# Patient Record
Sex: FEMALE
[De-identification: names, ages, dates, MRNs, and addresses within clinical notes are randomized per-mention and may not be internally consistent; named-entity substitution may affect disease eponyms.]

---

## 2022-09-02 ENCOUNTER — DASHBOARD ENCOUNTERS (OUTPATIENT)
Age: 72
End: 2022-09-02

## 2022-09-02 ENCOUNTER — OFFICE VISIT (OUTPATIENT)
Dept: URBAN - METROPOLITAN AREA CLINIC 7 | Facility: CLINIC | Age: 72
End: 2022-09-02
Payer: MEDICARE

## 2022-09-02 VITALS
DIASTOLIC BLOOD PRESSURE: 76 MMHG | SYSTOLIC BLOOD PRESSURE: 110 MMHG | BODY MASS INDEX: 23.9 KG/M2 | WEIGHT: 140 LBS | HEIGHT: 64 IN | TEMPERATURE: 98 F

## 2022-09-02 DIAGNOSIS — K57.32 DIVERTICULITIS OF LARGE INTESTINE WITH COMPLICATION: ICD-10-CM

## 2022-09-02 DIAGNOSIS — R10.32 LLQ PAIN: ICD-10-CM

## 2022-09-02 PROCEDURE — 99204 OFFICE O/P NEW MOD 45 MIN: CPT | Performed by: INTERNAL MEDICINE

## 2022-09-02 RX ORDER — LISINOPRIL 40 MG/1
1 TABLET TABLET ORAL ONCE A DAY
Qty: 30 | Status: ACTIVE | COMMUNITY
Start: 2022-09-02

## 2022-09-02 RX ORDER — ASCORBIC ACID 1000 MG
AS DIRECTED TABLET ORAL
Status: ACTIVE | COMMUNITY
Start: 2022-09-02

## 2022-09-02 RX ORDER — ESCITALOPRAM OXALATE 20 MG/1
1 TABLET TABLET, FILM COATED ORAL ONCE A DAY
Qty: 30 | Status: ACTIVE | COMMUNITY
Start: 2022-09-02
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
## 2022-09-12 PROBLEM — 733159000: Status: ACTIVE | Noted: 2022-09-02

## 2022-09-12 PROBLEM — 301716002: Status: ACTIVE | Noted: 2022-09-02

## 2022-09-26 ENCOUNTER — LAB OUTSIDE AN ENCOUNTER (OUTPATIENT)
Dept: URBAN - METROPOLITAN AREA CLINIC 7 | Facility: CLINIC | Age: 72
End: 2022-09-26

## 2022-09-26 ENCOUNTER — OFFICE VISIT (OUTPATIENT)
Dept: URBAN - METROPOLITAN AREA SURGERY CENTER 5 | Facility: SURGERY CENTER | Age: 72
End: 2022-09-26
Payer: MEDICARE

## 2022-09-26 DIAGNOSIS — Z53.8 FAILED ATTEMPTED SURGICAL PROCEDURE: ICD-10-CM

## 2022-09-26 DIAGNOSIS — Z87.19 ESOPHAGEAL FOOD BOLUS: ICD-10-CM

## 2022-09-26 DIAGNOSIS — K57.30 ACQUIRED DIVERTICULOSIS OF COLON: ICD-10-CM

## 2022-09-26 DIAGNOSIS — K64.0 BLEEDING GRADE I HEMORRHOIDS: ICD-10-CM

## 2022-09-26 PROCEDURE — 45378 DIAGNOSTIC COLONOSCOPY: CPT | Performed by: INTERNAL MEDICINE

## 2022-09-26 RX ORDER — LISINOPRIL 40 MG/1
1 TABLET TABLET ORAL ONCE A DAY
Qty: 30 | Status: ACTIVE | COMMUNITY
Start: 2022-09-02

## 2022-09-26 RX ORDER — ESCITALOPRAM OXALATE 20 MG/1
1 TABLET TABLET, FILM COATED ORAL ONCE A DAY
Qty: 30 | Status: ACTIVE | COMMUNITY
Start: 2022-09-02

## 2022-09-26 RX ORDER — ASCORBIC ACID 1000 MG
AS DIRECTED TABLET ORAL
Status: ACTIVE | COMMUNITY
Start: 2022-09-02

## 2022-10-07 PROBLEM — Z00.00 ENCOUNTER FOR PREVENTIVE HEALTH EXAMINATION: Status: ACTIVE | Noted: 2022-10-07

## 2022-10-11 ENCOUNTER — NON-APPOINTMENT (OUTPATIENT)
Age: 72
End: 2022-10-11

## 2022-10-12 ENCOUNTER — TELEPHONE ENCOUNTER (OUTPATIENT)
Dept: URBAN - METROPOLITAN AREA CLINIC 7 | Facility: CLINIC | Age: 72
End: 2022-10-12

## 2022-10-12 ENCOUNTER — LAB OUTSIDE AN ENCOUNTER (OUTPATIENT)
Dept: URBAN - METROPOLITAN AREA CLINIC 7 | Facility: CLINIC | Age: 72
End: 2022-10-12

## 2022-10-19 ENCOUNTER — NON-APPOINTMENT (OUTPATIENT)
Age: 72
End: 2022-10-19

## 2022-10-19 ENCOUNTER — APPOINTMENT (OUTPATIENT)
Dept: SURGERY | Facility: CLINIC | Age: 72
End: 2022-10-19

## 2022-10-19 VITALS
HEIGHT: 64 IN | BODY MASS INDEX: 23.9 KG/M2 | TEMPERATURE: 97.3 F | WEIGHT: 140 LBS | OXYGEN SATURATION: 97 % | HEART RATE: 77 BPM | DIASTOLIC BLOOD PRESSURE: 100 MMHG | SYSTOLIC BLOOD PRESSURE: 170 MMHG

## 2022-10-19 DIAGNOSIS — F41.9 ANXIETY DISORDER, UNSPECIFIED: ICD-10-CM

## 2022-10-19 DIAGNOSIS — I10 ESSENTIAL (PRIMARY) HYPERTENSION: ICD-10-CM

## 2022-10-19 LAB
ALBUMIN SERPL ELPH-MCNC: 4.9 G/DL
ALP BLD-CCNC: 60 U/L
ALT SERPL-CCNC: 11 U/L
ANION GAP SERPL CALC-SCNC: 12 MMOL/L
AST SERPL-CCNC: 18 U/L
BASOPHILS # BLD AUTO: 0.06 K/UL
BASOPHILS NFR BLD AUTO: 1.1 %
BILIRUB SERPL-MCNC: 0.2 MG/DL
BUN SERPL-MCNC: 13 MG/DL
CALCIUM SERPL-MCNC: 9.5 MG/DL
CHLORIDE SERPL-SCNC: 103 MMOL/L
CO2 SERPL-SCNC: 27 MMOL/L
CREAT SERPL-MCNC: 0.8 MG/DL
EGFR: 79 ML/MIN/1.73M2
EOSINOPHIL # BLD AUTO: 0.04 K/UL
EOSINOPHIL NFR BLD AUTO: 0.8 %
GLUCOSE SERPL-MCNC: 90 MG/DL
HCT VFR BLD CALC: 36.4 %
HGB BLD-MCNC: 12 G/DL
IMM GRANULOCYTES NFR BLD AUTO: 0.2 %
LYMPHOCYTES # BLD AUTO: 1.93 K/UL
LYMPHOCYTES NFR BLD AUTO: 36.2 %
MAN DIFF?: NORMAL
MCHC RBC-ENTMCNC: 28.8 PG
MCHC RBC-ENTMCNC: 33 G/DL
MCV RBC AUTO: 87.5 FL
MONOCYTES # BLD AUTO: 0.32 K/UL
MONOCYTES NFR BLD AUTO: 6 %
NEUTROPHILS # BLD AUTO: 2.97 K/UL
NEUTROPHILS NFR BLD AUTO: 55.7 %
PLATELET # BLD AUTO: 216 K/UL
POTASSIUM SERPL-SCNC: 4.1 MMOL/L
PROT SERPL-MCNC: 7.2 G/DL
RBC # BLD: 4.16 M/UL
RBC # FLD: 13.2 %
SODIUM SERPL-SCNC: 142 MMOL/L
WBC # FLD AUTO: 5.33 K/UL

## 2022-10-19 PROCEDURE — 99204 OFFICE O/P NEW MOD 45 MIN: CPT

## 2022-10-19 RX ORDER — ESCITALOPRAM OXALATE 5 MG/1
TABLET, FILM COATED ORAL
Refills: 0 | Status: ACTIVE | COMMUNITY

## 2022-10-19 RX ORDER — LISINOPRIL 30 MG/1
TABLET ORAL
Refills: 0 | Status: ACTIVE | COMMUNITY

## 2022-10-19 NOTE — PHYSICAL EXAM
[Abdomen Masses] : No abdominal masses [Abdomen Tenderness] : ~T No ~M abdominal tenderness [Respiratory Effort] : normal respiratory effort [Normal Rate and Rhythm] : normal rate and rhythm [de-identified] : awake, alert and in no acute distress
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
## 2022-10-19 NOTE — ASSESSMENT
[FreeTextEntry1] : 71F with complicated sigmoid diverticulitis\par \par We will obtain a CTAP prior to surgery to evaluate her anatomy.  I recommended that she undergo robot assisted laparoscopic sigmoidectomy possible open, possible ostomy.  All risks benefits and alternatives were discussed including bleeding, infection, damage to surrounding structures including the ureters, anastomotic leak, anastomotic stricture, cardiopulmonary events, thromboembolic events and hernia. We expect a 3-7 day hospital stay and 6-8 week recovery. Patient expressed understanding and was in agreement with the plan.\par

## 2022-10-20 ENCOUNTER — TRANSCRIPTION ENCOUNTER (OUTPATIENT)
Age: 72
End: 2022-10-20

## 2022-10-27 ENCOUNTER — RESULT REVIEW (OUTPATIENT)
Age: 72
End: 2022-10-27

## 2022-10-27 ENCOUNTER — OUTPATIENT (OUTPATIENT)
Dept: OUTPATIENT SERVICES | Facility: HOSPITAL | Age: 72
LOS: 1 days | Discharge: HOME | End: 2022-10-27

## 2022-10-27 VITALS
TEMPERATURE: 98 F | WEIGHT: 139.99 LBS | RESPIRATION RATE: 17 BRPM | OXYGEN SATURATION: 98 % | HEIGHT: 64 IN | SYSTOLIC BLOOD PRESSURE: 184 MMHG | HEART RATE: 54 BPM | DIASTOLIC BLOOD PRESSURE: 79 MMHG

## 2022-10-27 DIAGNOSIS — Z96.642 PRESENCE OF LEFT ARTIFICIAL HIP JOINT: Chronic | ICD-10-CM

## 2022-10-27 DIAGNOSIS — K57.32 DIVERTICULITIS OF LARGE INTESTINE WITHOUT PERFORATION OR ABSCESS WITHOUT BLEEDING: ICD-10-CM

## 2022-10-27 DIAGNOSIS — K56.699 OTHER INTESTINAL OBSTRUCTION UNSPECIFIED AS TO PARTIAL VERSUS COMPLETE OBSTRUCTION: ICD-10-CM

## 2022-10-27 DIAGNOSIS — Z98.890 OTHER SPECIFIED POSTPROCEDURAL STATES: Chronic | ICD-10-CM

## 2022-10-27 DIAGNOSIS — Z90.89 ACQUIRED ABSENCE OF OTHER ORGANS: Chronic | ICD-10-CM

## 2022-10-27 DIAGNOSIS — Z90.710 ACQUIRED ABSENCE OF BOTH CERVIX AND UTERUS: Chronic | ICD-10-CM

## 2022-10-27 DIAGNOSIS — Z01.818 ENCOUNTER FOR OTHER PREPROCEDURAL EXAMINATION: ICD-10-CM

## 2022-10-27 DIAGNOSIS — Z98.61 CORONARY ANGIOPLASTY STATUS: Chronic | ICD-10-CM

## 2022-10-27 LAB
A1C WITH ESTIMATED AVERAGE GLUCOSE RESULT: 5.6 % — SIGNIFICANT CHANGE UP (ref 4–5.6)
ALBUMIN SERPL ELPH-MCNC: 4.7 G/DL — SIGNIFICANT CHANGE UP (ref 3.5–5.2)
ALP SERPL-CCNC: 66 U/L — SIGNIFICANT CHANGE UP (ref 30–115)
ALT FLD-CCNC: 9 U/L — SIGNIFICANT CHANGE UP (ref 0–41)
ANION GAP SERPL CALC-SCNC: 11 MMOL/L — SIGNIFICANT CHANGE UP (ref 7–14)
APPEARANCE UR: CLEAR — SIGNIFICANT CHANGE UP
APTT BLD: 30.5 SEC — SIGNIFICANT CHANGE UP (ref 27–39.2)
AST SERPL-CCNC: 15 U/L — SIGNIFICANT CHANGE UP (ref 0–41)
BACTERIA # UR AUTO: NEGATIVE — SIGNIFICANT CHANGE UP
BASOPHILS # BLD AUTO: 0.07 K/UL — SIGNIFICANT CHANGE UP (ref 0–0.2)
BASOPHILS NFR BLD AUTO: 1.5 % — HIGH (ref 0–1)
BILIRUB SERPL-MCNC: 0.2 MG/DL — SIGNIFICANT CHANGE UP (ref 0.2–1.2)
BILIRUB UR-MCNC: NEGATIVE — SIGNIFICANT CHANGE UP
BLD GP AB SCN SERPL QL: SIGNIFICANT CHANGE UP
BUN SERPL-MCNC: 19 MG/DL — SIGNIFICANT CHANGE UP (ref 10–20)
CALCIUM SERPL-MCNC: 9 MG/DL — SIGNIFICANT CHANGE UP (ref 8.4–10.5)
CHLORIDE SERPL-SCNC: 105 MMOL/L — SIGNIFICANT CHANGE UP (ref 98–110)
CO2 SERPL-SCNC: 27 MMOL/L — SIGNIFICANT CHANGE UP (ref 17–32)
COLOR SPEC: YELLOW — SIGNIFICANT CHANGE UP
CREAT SERPL-MCNC: 0.8 MG/DL — SIGNIFICANT CHANGE UP (ref 0.7–1.5)
DIFF PNL FLD: NEGATIVE — SIGNIFICANT CHANGE UP
EGFR: 79 ML/MIN/1.73M2 — SIGNIFICANT CHANGE UP
EOSINOPHIL # BLD AUTO: 0.11 K/UL — SIGNIFICANT CHANGE UP (ref 0–0.7)
EOSINOPHIL NFR BLD AUTO: 2.3 % — SIGNIFICANT CHANGE UP (ref 0–8)
EPI CELLS # UR: 4 /HPF — SIGNIFICANT CHANGE UP (ref 0–5)
ESTIMATED AVERAGE GLUCOSE: 114 MG/DL — SIGNIFICANT CHANGE UP (ref 68–114)
GLUCOSE SERPL-MCNC: 90 MG/DL — SIGNIFICANT CHANGE UP (ref 70–99)
GLUCOSE UR QL: NEGATIVE — SIGNIFICANT CHANGE UP
HCT VFR BLD CALC: 39 % — SIGNIFICANT CHANGE UP (ref 37–47)
HGB BLD-MCNC: 13 G/DL — SIGNIFICANT CHANGE UP (ref 12–16)
HYALINE CASTS # UR AUTO: 3 /LPF — SIGNIFICANT CHANGE UP (ref 0–7)
IMM GRANULOCYTES NFR BLD AUTO: 0.4 % — HIGH (ref 0.1–0.3)
INR BLD: 0.89 RATIO — SIGNIFICANT CHANGE UP (ref 0.65–1.3)
KETONES UR-MCNC: NEGATIVE — SIGNIFICANT CHANGE UP
LEUKOCYTE ESTERASE UR-ACNC: NEGATIVE — SIGNIFICANT CHANGE UP
LYMPHOCYTES # BLD AUTO: 1.59 K/UL — SIGNIFICANT CHANGE UP (ref 1.2–3.4)
LYMPHOCYTES # BLD AUTO: 33.1 % — SIGNIFICANT CHANGE UP (ref 20.5–51.1)
MCHC RBC-ENTMCNC: 29.4 PG — SIGNIFICANT CHANGE UP (ref 27–31)
MCHC RBC-ENTMCNC: 33.3 G/DL — SIGNIFICANT CHANGE UP (ref 32–37)
MCV RBC AUTO: 88.2 FL — SIGNIFICANT CHANGE UP (ref 81–99)
MONOCYTES # BLD AUTO: 0.32 K/UL — SIGNIFICANT CHANGE UP (ref 0.1–0.6)
MONOCYTES NFR BLD AUTO: 6.7 % — SIGNIFICANT CHANGE UP (ref 1.7–9.3)
MRSA PCR RESULT.: NEGATIVE — SIGNIFICANT CHANGE UP
NEUTROPHILS # BLD AUTO: 2.69 K/UL — SIGNIFICANT CHANGE UP (ref 1.4–6.5)
NEUTROPHILS NFR BLD AUTO: 56 % — SIGNIFICANT CHANGE UP (ref 42.2–75.2)
NITRITE UR-MCNC: NEGATIVE — SIGNIFICANT CHANGE UP
NRBC # BLD: 0 /100 WBCS — SIGNIFICANT CHANGE UP (ref 0–0)
PH UR: 6 — SIGNIFICANT CHANGE UP (ref 5–8)
PLATELET # BLD AUTO: 212 K/UL — SIGNIFICANT CHANGE UP (ref 130–400)
POTASSIUM SERPL-MCNC: 4.5 MMOL/L — SIGNIFICANT CHANGE UP (ref 3.5–5)
POTASSIUM SERPL-SCNC: 4.5 MMOL/L — SIGNIFICANT CHANGE UP (ref 3.5–5)
PROT SERPL-MCNC: 7.1 G/DL — SIGNIFICANT CHANGE UP (ref 6–8)
PROT UR-MCNC: ABNORMAL
PROTHROM AB SERPL-ACNC: 10.1 SEC — SIGNIFICANT CHANGE UP (ref 9.95–12.87)
RBC # BLD: 4.42 M/UL — SIGNIFICANT CHANGE UP (ref 4.2–5.4)
RBC # FLD: 13.2 % — SIGNIFICANT CHANGE UP (ref 11.5–14.5)
RBC CASTS # UR COMP ASSIST: 3 /HPF — SIGNIFICANT CHANGE UP (ref 0–4)
SODIUM SERPL-SCNC: 143 MMOL/L — SIGNIFICANT CHANGE UP (ref 135–146)
SP GR SPEC: 1.03 — HIGH (ref 1.01–1.03)
UROBILINOGEN FLD QL: SIGNIFICANT CHANGE UP
WBC # BLD: 4.8 K/UL — SIGNIFICANT CHANGE UP (ref 4.8–10.8)
WBC # FLD AUTO: 4.8 K/UL — SIGNIFICANT CHANGE UP (ref 4.8–10.8)
WBC UR QL: 1 /HPF — SIGNIFICANT CHANGE UP (ref 0–5)

## 2022-10-27 PROCEDURE — 93010 ELECTROCARDIOGRAM REPORT: CPT

## 2022-10-27 PROCEDURE — 71046 X-RAY EXAM CHEST 2 VIEWS: CPT | Mod: 26

## 2022-10-27 NOTE — H&P PST ADULT - HISTORY OF PRESENT ILLNESS
PATIENT DENIES CHEST PAIN, SHORTNESS OF BREATH, PALPITATIONS, COUGHING, FEVER, DYSURIA.  CAN WALK UP 7-8 FLIGHTS OF STEPS WITHOUT SOB.    NO COUGH, FEVER, SORE THROAT, HEADACHE, LOSS OF TASTE OR SMELL. NO KNOWN EXPOSURE TO ANYONE WITH COVID. PATIENT WAS INSTRUCTED TO ISOLATE FROM NOW UNTIL THE SURGERY.    Anesthesia Alert  NO--Difficult Airway  NO--History of neck surgery or radiation  NO--Limited ROM of neck  NO--History of Malignant hyperthermia  NO--Personal or family history of Pseudocholinesterase deficiency  NO--Prior Anesthesia Complication  NO--Latex Allergy  NO--Loose teeth  NO--History of Rheumatoid Arthritis  NO--ANDIE  NO--bleeding risk

## 2022-10-27 NOTE — H&P PST ADULT - NSICDXPASTSURGICALHX_GEN_ALL_CORE_FT
PAST SURGICAL HISTORY:  H/O coronary angioplasty STENT X1 (2018)    S/P tonsillectomy     S/P total hysterectomy     Status post hip replacement, left     Status post ORIF of fracture of ankle
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
## 2022-10-27 NOTE — H&P PST ADULT - NSICDXPASTMEDICALHX_GEN_ALL_CORE_FT
PAST MEDICAL HISTORY:  CAD (coronary artery disease) CARDIAC STENT X1 2018    Depression     Diverticulitis     HTN (hypertension)

## 2022-10-27 NOTE — H&P PST ADULT - NSICDXFAMILYHX_GEN_ALL_CORE_FT
FAMILY HISTORY:  Father  Still living? Unknown  Family history of heart attack, Age at diagnosis: Age Unknown    Mother  Still living? No  Family history of diabetes mellitus (DM), Age at diagnosis: Age Unknown  FH: esophageal cancer, Age at diagnosis: Age Unknown

## 2022-11-02 ENCOUNTER — OUTPATIENT (OUTPATIENT)
Dept: OUTPATIENT SERVICES | Facility: HOSPITAL | Age: 72
LOS: 1 days | Discharge: HOME | End: 2022-11-02

## 2022-11-02 ENCOUNTER — RESULT REVIEW (OUTPATIENT)
Age: 72
End: 2022-11-02

## 2022-11-02 DIAGNOSIS — Z90.89 ACQUIRED ABSENCE OF OTHER ORGANS: Chronic | ICD-10-CM

## 2022-11-02 DIAGNOSIS — Z90.710 ACQUIRED ABSENCE OF BOTH CERVIX AND UTERUS: Chronic | ICD-10-CM

## 2022-11-02 DIAGNOSIS — K57.32 DIVERTICULITIS OF LARGE INTESTINE WITHOUT PERFORATION OR ABSCESS WITHOUT BLEEDING: ICD-10-CM

## 2022-11-02 DIAGNOSIS — Z98.61 CORONARY ANGIOPLASTY STATUS: Chronic | ICD-10-CM

## 2022-11-02 DIAGNOSIS — Z98.890 OTHER SPECIFIED POSTPROCEDURAL STATES: Chronic | ICD-10-CM

## 2022-11-02 DIAGNOSIS — Z96.642 PRESENCE OF LEFT ARTIFICIAL HIP JOINT: Chronic | ICD-10-CM

## 2022-11-02 PROCEDURE — 74177 CT ABD & PELVIS W/CONTRAST: CPT | Mod: 26

## 2022-11-03 ENCOUNTER — NON-APPOINTMENT (OUTPATIENT)
Age: 72
End: 2022-11-03

## 2022-11-04 ENCOUNTER — NON-APPOINTMENT (OUTPATIENT)
Age: 72
End: 2022-11-04

## 2022-11-04 PROBLEM — K57.92 DIVERTICULITIS OF INTESTINE, PART UNSPECIFIED, WITHOUT PERFORATION OR ABSCESS WITHOUT BLEEDING: Chronic | Status: ACTIVE | Noted: 2022-10-27

## 2022-11-04 PROBLEM — I25.10 ATHEROSCLEROTIC HEART DISEASE OF NATIVE CORONARY ARTERY WITHOUT ANGINA PECTORIS: Chronic | Status: ACTIVE | Noted: 2022-10-27

## 2022-11-04 PROBLEM — I10 ESSENTIAL (PRIMARY) HYPERTENSION: Chronic | Status: ACTIVE | Noted: 2022-10-27

## 2022-11-04 PROBLEM — F32.A DEPRESSION, UNSPECIFIED: Chronic | Status: ACTIVE | Noted: 2022-10-27

## 2022-11-07 ENCOUNTER — LABORATORY RESULT (OUTPATIENT)
Age: 72
End: 2022-11-07

## 2022-11-09 NOTE — ASU PATIENT PROFILE, ADULT - WILL THE PATIENT ACCEPT THE PFIZER COVID-19 VACCINE IF ELIGIBLE AND IT IS AVAILABLE?
[facility], [place], Emergency Department  [address] [place] [id number]  Phone:  [phone]                                    Samuel Brooks   MRN: [id number]    Department:  [facility], Emergency Department   Date of Visit:  12/26/2019           After Visit Summary Signature Page    I have received my discharge instructions, and my questions have been answered. I have discussed any challenges I see with this plan with the nurse or doctor.    ..........................................................................................................................................  Patient/Patient Representative Signature      ..........................................................................................................................................  Patient Representative Print Name and Relationship to Patient    ..................................................               ................................................  Date                                   Time    ..........................................................................................................................................  Reviewed by Signature/Title    ...................................................              ..............................................  Date                                               Time          22EPIC Rev 08/18       
No

## 2022-11-09 NOTE — ASU PATIENT PROFILE, ADULT - NSICDXPASTSURGICALHX_GEN_ALL_CORE_FT
PAST SURGICAL HISTORY:  H/O coronary angioplasty STENT X1 (2018)    H/O right wrist surgery     S/P tonsillectomy     S/P total hysterectomy     Status post hip replacement, left     Status post ORIF of fracture of ankle

## 2022-11-10 ENCOUNTER — RESULT REVIEW (OUTPATIENT)
Age: 72
End: 2022-11-10

## 2022-11-10 ENCOUNTER — INPATIENT (INPATIENT)
Facility: HOSPITAL | Age: 72
LOS: 1 days | Discharge: HOME | End: 2022-11-12
Attending: COLON & RECTAL SURGERY | Admitting: COLON & RECTAL SURGERY

## 2022-11-10 ENCOUNTER — APPOINTMENT (OUTPATIENT)
Dept: SURGERY | Facility: HOSPITAL | Age: 72
End: 2022-11-10

## 2022-11-10 ENCOUNTER — TRANSCRIPTION ENCOUNTER (OUTPATIENT)
Age: 72
End: 2022-11-10

## 2022-11-10 VITALS
SYSTOLIC BLOOD PRESSURE: 137 MMHG | TEMPERATURE: 98 F | RESPIRATION RATE: 16 BRPM | HEIGHT: 64 IN | OXYGEN SATURATION: 99 % | DIASTOLIC BLOOD PRESSURE: 65 MMHG | HEART RATE: 62 BPM | WEIGHT: 139.99 LBS

## 2022-11-10 DIAGNOSIS — Z96.642 PRESENCE OF LEFT ARTIFICIAL HIP JOINT: Chronic | ICD-10-CM

## 2022-11-10 DIAGNOSIS — Z98.890 OTHER SPECIFIED POSTPROCEDURAL STATES: Chronic | ICD-10-CM

## 2022-11-10 DIAGNOSIS — Z98.61 CORONARY ANGIOPLASTY STATUS: Chronic | ICD-10-CM

## 2022-11-10 DIAGNOSIS — Z90.710 ACQUIRED ABSENCE OF BOTH CERVIX AND UTERUS: Chronic | ICD-10-CM

## 2022-11-10 DIAGNOSIS — Z90.89 ACQUIRED ABSENCE OF OTHER ORGANS: Chronic | ICD-10-CM

## 2022-11-10 LAB
ABO RH CONFIRMATION: SIGNIFICANT CHANGE UP
ANION GAP SERPL CALC-SCNC: 11 MMOL/L — SIGNIFICANT CHANGE UP (ref 7–14)
BASOPHILS # BLD AUTO: 0.02 K/UL — SIGNIFICANT CHANGE UP (ref 0–0.2)
BASOPHILS NFR BLD AUTO: 0.2 % — SIGNIFICANT CHANGE UP (ref 0–1)
BUN SERPL-MCNC: 11 MG/DL — SIGNIFICANT CHANGE UP (ref 10–20)
CALCIUM SERPL-MCNC: 8.2 MG/DL — LOW (ref 8.4–10.5)
CHLORIDE SERPL-SCNC: 105 MMOL/L — SIGNIFICANT CHANGE UP (ref 98–110)
CO2 SERPL-SCNC: 23 MMOL/L — SIGNIFICANT CHANGE UP (ref 17–32)
CREAT SERPL-MCNC: 0.8 MG/DL — SIGNIFICANT CHANGE UP (ref 0.7–1.5)
EGFR: 79 ML/MIN/1.73M2 — SIGNIFICANT CHANGE UP
EOSINOPHIL # BLD AUTO: 0 K/UL — SIGNIFICANT CHANGE UP (ref 0–0.7)
EOSINOPHIL NFR BLD AUTO: 0 % — SIGNIFICANT CHANGE UP (ref 0–8)
GLUCOSE BLDC GLUCOMTR-MCNC: 117 MG/DL — HIGH (ref 70–99)
GLUCOSE BLDC GLUCOMTR-MCNC: 130 MG/DL — HIGH (ref 70–99)
GLUCOSE SERPL-MCNC: 135 MG/DL — HIGH (ref 70–99)
HCT VFR BLD CALC: 36.9 % — LOW (ref 37–47)
HGB BLD-MCNC: 12.5 G/DL — SIGNIFICANT CHANGE UP (ref 12–16)
IMM GRANULOCYTES NFR BLD AUTO: 0.2 % — SIGNIFICANT CHANGE UP (ref 0.1–0.3)
LYMPHOCYTES # BLD AUTO: 0.57 K/UL — LOW (ref 1.2–3.4)
LYMPHOCYTES # BLD AUTO: 6.5 % — LOW (ref 20.5–51.1)
MAGNESIUM SERPL-MCNC: 1.7 MG/DL — LOW (ref 1.8–2.4)
MCHC RBC-ENTMCNC: 29.6 PG — SIGNIFICANT CHANGE UP (ref 27–31)
MCHC RBC-ENTMCNC: 33.9 G/DL — SIGNIFICANT CHANGE UP (ref 32–37)
MCV RBC AUTO: 87.2 FL — SIGNIFICANT CHANGE UP (ref 81–99)
MONOCYTES # BLD AUTO: 0.14 K/UL — SIGNIFICANT CHANGE UP (ref 0.1–0.6)
MONOCYTES NFR BLD AUTO: 1.6 % — LOW (ref 1.7–9.3)
NEUTROPHILS # BLD AUTO: 7.98 K/UL — HIGH (ref 1.4–6.5)
NEUTROPHILS NFR BLD AUTO: 91.5 % — HIGH (ref 42.2–75.2)
NRBC # BLD: 0 /100 WBCS — SIGNIFICANT CHANGE UP (ref 0–0)
PHOSPHATE SERPL-MCNC: 4.4 MG/DL — SIGNIFICANT CHANGE UP (ref 2.1–4.9)
PLATELET # BLD AUTO: 187 K/UL — SIGNIFICANT CHANGE UP (ref 130–400)
POTASSIUM SERPL-MCNC: 3.6 MMOL/L — SIGNIFICANT CHANGE UP (ref 3.5–5)
POTASSIUM SERPL-SCNC: 3.6 MMOL/L — SIGNIFICANT CHANGE UP (ref 3.5–5)
RBC # BLD: 4.23 M/UL — SIGNIFICANT CHANGE UP (ref 4.2–5.4)
RBC # FLD: 13.4 % — SIGNIFICANT CHANGE UP (ref 11.5–14.5)
SODIUM SERPL-SCNC: 139 MMOL/L — SIGNIFICANT CHANGE UP (ref 135–146)
WBC # BLD: 8.73 K/UL — SIGNIFICANT CHANGE UP (ref 4.8–10.8)
WBC # FLD AUTO: 8.73 K/UL — SIGNIFICANT CHANGE UP (ref 4.8–10.8)

## 2022-11-10 PROCEDURE — 88307 TISSUE EXAM BY PATHOLOGIST: CPT | Mod: 26

## 2022-11-10 PROCEDURE — S2900 ROBOTIC SURGICAL SYSTEM: CPT | Mod: NC

## 2022-11-10 PROCEDURE — 45330 DIAGNOSTIC SIGMOIDOSCOPY: CPT

## 2022-11-10 PROCEDURE — 44213 LAP MOBIL SPLENIC FL ADD-ON: CPT

## 2022-11-10 PROCEDURE — 88304 TISSUE EXAM BY PATHOLOGIST: CPT | Mod: 26

## 2022-11-10 PROCEDURE — 44207 L COLECTOMY/COLOPROCTOSTOMY: CPT

## 2022-11-10 RX ORDER — ESTRADIOL 4 UG/1
1 INSERT VAGINAL DAILY
Refills: 0 | Status: DISCONTINUED | OUTPATIENT
Start: 2022-11-10 | End: 2022-11-12

## 2022-11-10 RX ORDER — MAGNESIUM GLYCINATE 100 MG
2 CAPSULE ORAL
Qty: 0 | Refills: 0 | DISCHARGE

## 2022-11-10 RX ORDER — BUPIVACAINE 13.3 MG/ML
20 INJECTION, SUSPENSION, LIPOSOMAL INFILTRATION ONCE
Refills: 0 | Status: DISCONTINUED | OUTPATIENT
Start: 2022-11-10 | End: 2022-11-10

## 2022-11-10 RX ORDER — LISINOPRIL 2.5 MG/1
40 TABLET ORAL DAILY
Refills: 0 | Status: DISCONTINUED | OUTPATIENT
Start: 2022-11-10 | End: 2022-11-12

## 2022-11-10 RX ORDER — GABAPENTIN 400 MG/1
300 CAPSULE ORAL ONCE
Refills: 0 | Status: COMPLETED | OUTPATIENT
Start: 2022-11-10 | End: 2022-11-10

## 2022-11-10 RX ORDER — CEFOTETAN DISODIUM 1 G
2 VIAL (EA) INJECTION EVERY 12 HOURS
Refills: 0 | Status: COMPLETED | OUTPATIENT
Start: 2022-11-10 | End: 2022-11-11

## 2022-11-10 RX ORDER — ACETAMINOPHEN 500 MG
650 TABLET ORAL EVERY 6 HOURS
Refills: 0 | Status: DISCONTINUED | OUTPATIENT
Start: 2022-11-10 | End: 2022-11-12

## 2022-11-10 RX ORDER — ACETAMINOPHEN 500 MG
1000 TABLET ORAL ONCE
Refills: 0 | Status: COMPLETED | OUTPATIENT
Start: 2022-11-10 | End: 2022-11-10

## 2022-11-10 RX ORDER — LISINOPRIL 2.5 MG/1
1 TABLET ORAL
Qty: 0 | Refills: 0 | DISCHARGE

## 2022-11-10 RX ORDER — ESTRADIOL 4 UG/1
0 INSERT VAGINAL
Qty: 0 | Refills: 0 | DISCHARGE

## 2022-11-10 RX ORDER — ASPIRIN/CALCIUM CARB/MAGNESIUM 324 MG
81 TABLET ORAL DAILY
Refills: 0 | Status: DISCONTINUED | OUTPATIENT
Start: 2022-11-10 | End: 2022-11-12

## 2022-11-10 RX ORDER — SODIUM CHLORIDE 9 MG/ML
1000 INJECTION, SOLUTION INTRAVENOUS
Refills: 0 | Status: DISCONTINUED | OUTPATIENT
Start: 2022-11-10 | End: 2022-11-11

## 2022-11-10 RX ORDER — KETOROLAC TROMETHAMINE 30 MG/ML
30 SYRINGE (ML) INJECTION EVERY 6 HOURS
Refills: 0 | Status: DISCONTINUED | OUTPATIENT
Start: 2022-11-10 | End: 2022-11-12

## 2022-11-10 RX ORDER — HEPARIN SODIUM 5000 [USP'U]/ML
5000 INJECTION INTRAVENOUS; SUBCUTANEOUS ONCE
Refills: 0 | Status: COMPLETED | OUTPATIENT
Start: 2022-11-10 | End: 2022-11-10

## 2022-11-10 RX ORDER — CEFOTETAN DISODIUM 1 G
2000 VIAL (EA) INJECTION EVERY 24 HOURS
Refills: 0 | Status: DISCONTINUED | OUTPATIENT
Start: 2022-11-10 | End: 2022-11-10

## 2022-11-10 RX ORDER — ESCITALOPRAM OXALATE 10 MG/1
1 TABLET, FILM COATED ORAL
Qty: 0 | Refills: 0 | DISCHARGE

## 2022-11-10 RX ORDER — HYDROMORPHONE HYDROCHLORIDE 2 MG/ML
0.25 INJECTION INTRAMUSCULAR; INTRAVENOUS; SUBCUTANEOUS EVERY 6 HOURS
Refills: 0 | Status: DISCONTINUED | OUTPATIENT
Start: 2022-11-10 | End: 2022-11-12

## 2022-11-10 RX ORDER — ESCITALOPRAM OXALATE 10 MG/1
20 TABLET, FILM COATED ORAL DAILY
Refills: 0 | Status: DISCONTINUED | OUTPATIENT
Start: 2022-11-10 | End: 2022-11-12

## 2022-11-10 RX ORDER — ASPIRIN/CALCIUM CARB/MAGNESIUM 324 MG
1 TABLET ORAL
Qty: 0 | Refills: 0 | DISCHARGE

## 2022-11-10 RX ADMIN — Medication 1000 MILLIGRAM(S): at 07:11

## 2022-11-10 RX ADMIN — GABAPENTIN 300 MILLIGRAM(S): 400 CAPSULE ORAL at 06:58

## 2022-11-10 RX ADMIN — Medication 100 GRAM(S): at 17:55

## 2022-11-10 RX ADMIN — HEPARIN SODIUM 5000 UNIT(S): 5000 INJECTION INTRAVENOUS; SUBCUTANEOUS at 06:57

## 2022-11-10 RX ADMIN — Medication 1 TABLET(S): at 17:55

## 2022-11-10 NOTE — PATIENT PROFILE ADULT - FALL HARM RISK - HARM RISK INTERVENTIONS

## 2022-11-10 NOTE — CHART NOTE - NSCHARTNOTEFT_GEN_A_CORE
PACU ANESTHESIA ADMISSION NOTE  ____  Intubated  TV:______       Rate: ______      FiO2: ______  x____  Patent Airway  x____  Full return of protective reflexes  x____  Full recovery from anesthesia / back to baseline   Mental Status:    x____ Awake    x____ Alert     ____ Drowsy    ____ Sedated  Nausea/Vomiting:   x____ NO    ____ Yes,   See Post - Op Orders        Pain Scale (0-10):    ____ Treatment:   x____ None      ____ See Post - Op/PCA Orders  Post - Operative Fluids:    x____ Oral     ____ See Post - Op Orders  Plan: Discharge:     ____Home         x_____Floor       _____Critical Care      _____  Other:_________________    Comments:

## 2022-11-10 NOTE — BRIEF OPERATIVE NOTE - NSICDXBRIEFPROCEDURE_GEN_ALL_CORE_FT
PROCEDURES:  Robot-assisted sigmoidectomy with colorectal anastomosis 10-Nov-2022 13:19:43  Rayne Mercer

## 2022-11-10 NOTE — CHART NOTE - NSCHARTNOTEFT_GEN_A_CORE
Post Operative Note  Patient: OSVALDO KOHLI 71y (1950) Female   MRN: [id number]  Location: [address]  Visit: 11-10-22 Inpatient  Date: 11-10-22 @ 17:28    Procedure: Diverticulitis     S/P Robot-assisted sigmoidectomy with colorectal anastomosis        Subjective:   Nausea: no, Vomiting: no, Ambulating: no, Flatus: yes   Pain Assessment: Patient is complaining of mild pain that is appropriate for post-operative course.     Objective:  Vitals: T(F): 98.9 (11-10-22 @ 17:24), Max: 98.9 (11-10-22 @ 17:24)  HR: 70 (11-10-22 @ 17:24)  BP: 141/63 (11-10-22 @ 17:24) (124/60 - 141/65)  RR: 18 (11-10-22 @ 17:24)  SpO2: 96% (11-10-22 @ 17:24)  Vent Settings:     In:   11-10-22 @ 07:01  -  11-10-22 @ 17:28  --------------------------------------------------------  IN: 0 mL      IV Fluids: calcium carbonate 1250 mG  + Vitamin D (OsCal 500 + D) 1 Tablet(s) Oral every 12 hours  multivitamin 1 Tablet(s) Oral daily      Out:   11-10-22 @ 07:01  -  11-10-22 @ 17:28  --------------------------------------------------------  OUT: 50 mL      EBL:     Voided Urine:   11-10-22 @ 07:01  -  11-10-22 @ 17:28  --------------------------------------------------------  OUT: 50 mL      Arredondo Catheter: yes no   Drains:   JESUS ALBERTO:    ,   Chest Tube:      NG Tube:       Physical Examination:  General Appearance: NAD, calm  HEENT: EOMI, sclera non-icteric.  Heart: RRR  Lungs: CTABL  Abdomen:  Soft, non tender, appropriate for post-op course, nondistended. No rigidity, guarding, or rebound tenderness.   MSK/Extremities: Warm & well-perfused. Peripheral pulses intact.  Skin: Warm, dry. No jaundice.   Incisions/Wounds: Dressings in place, clean, dry and intact, no signs of infection/active bleeding/drainage    Medications: [Standing]  acetaminophen     Tablet .. 650 milliGRAM(s) Oral every 6 hours  aspirin  chewable 81 milliGRAM(s) Oral daily  calcium carbonate 1250 mG  + Vitamin D (OsCal 500 + D) 1 Tablet(s) Oral every 12 hours  cefoTEtan  Injectable 2000 milliGRAM(s) IntraMuscular every 24 hours  escitalopram 20 milliGRAM(s) Oral daily  estradiol 0.01% Vaginal Cream 1 Gram(s) Vaginal daily  HYDROmorphone  Injectable 0.25 milliGRAM(s) IV Push every 6 hours PRN  ketorolac   Injectable 30 milliGRAM(s) IV Push every 6 hours PRN  lisinopril 40 milliGRAM(s) Oral daily  multivitamin 1 Tablet(s) Oral daily    Medications: [PRN]  acetaminophen     Tablet .. 650 milliGRAM(s) Oral every 6 hours  aspirin  chewable 81 milliGRAM(s) Oral daily  calcium carbonate 1250 mG  + Vitamin D (OsCal 500 + D) 1 Tablet(s) Oral every 12 hours  cefoTEtan  Injectable 2000 milliGRAM(s) IntraMuscular every 24 hours  escitalopram 20 milliGRAM(s) Oral daily  estradiol 0.01% Vaginal Cream 1 Gram(s) Vaginal daily  HYDROmorphone  Injectable 0.25 milliGRAM(s) IV Push every 6 hours PRN  ketorolac   Injectable 30 milliGRAM(s) IV Push every 6 hours PRN  lisinopril 40 milliGRAM(s) Oral daily  multivitamin 1 Tablet(s) Oral daily      DVT PROPHYLAXIS:   GI PROPHYLAXIS:   ANTICOAGULATION:   ANTIBIOTICS:  cefoTEtan  Injectable 2000 milliGRAM(s)        Labs:                        12.5   8.73  )-----------( 187      ( 10 Nov 2022 13:54 )             36.9     11-10    139  |  105  |  11  ----------------------------<  135<H>  3.6   |  23  |  0.8    Ca    8.2<L>      10 Nov 2022 13:54  Phos  4.4     11-10  Mg     1.7     11-10              Imaging:  No post-op imaging studies    Assessment:  71yF  S/P Robot-assisted sigmoidectomy with colorectal anastomosis    Plan:  - Monitor vitals  - Monitor post-op labs and replete as necessary  - Monitor for bowel function  - Continue Pain Medications if necessary  - Continue Antibiotics   - Encourage ambulation as tolerated  - Monitor urine output and trial of void once Arredondo removed in AM  - Monitor wound       Date/Time: 11-10-22 @ 17:28

## 2022-11-11 LAB
ANION GAP SERPL CALC-SCNC: 10 MMOL/L — SIGNIFICANT CHANGE UP (ref 7–14)
ANION GAP SERPL CALC-SCNC: 16 MMOL/L — HIGH (ref 7–14)
BASOPHILS # BLD AUTO: 0.02 K/UL — SIGNIFICANT CHANGE UP (ref 0–0.2)
BASOPHILS # BLD AUTO: 0.03 K/UL — SIGNIFICANT CHANGE UP (ref 0–0.2)
BASOPHILS NFR BLD AUTO: 0.3 % — SIGNIFICANT CHANGE UP (ref 0–1)
BASOPHILS NFR BLD AUTO: 0.4 % — SIGNIFICANT CHANGE UP (ref 0–1)
BUN SERPL-MCNC: 14 MG/DL — SIGNIFICANT CHANGE UP (ref 10–20)
BUN SERPL-MCNC: 8 MG/DL — LOW (ref 10–20)
CALCIUM SERPL-MCNC: 8.3 MG/DL — LOW (ref 8.4–10.5)
CALCIUM SERPL-MCNC: 8.4 MG/DL — SIGNIFICANT CHANGE UP (ref 8.4–10.5)
CHLORIDE SERPL-SCNC: 101 MMOL/L — SIGNIFICANT CHANGE UP (ref 98–110)
CHLORIDE SERPL-SCNC: 113 MMOL/L — HIGH (ref 98–110)
CO2 SERPL-SCNC: 19 MMOL/L — SIGNIFICANT CHANGE UP (ref 17–32)
CO2 SERPL-SCNC: 26 MMOL/L — SIGNIFICANT CHANGE UP (ref 17–32)
CREAT SERPL-MCNC: 0.8 MG/DL — SIGNIFICANT CHANGE UP (ref 0.7–1.5)
CREAT SERPL-MCNC: 0.8 MG/DL — SIGNIFICANT CHANGE UP (ref 0.7–1.5)
EGFR: 79 ML/MIN/1.73M2 — SIGNIFICANT CHANGE UP
EGFR: 79 ML/MIN/1.73M2 — SIGNIFICANT CHANGE UP
EOSINOPHIL # BLD AUTO: 0.01 K/UL — SIGNIFICANT CHANGE UP (ref 0–0.7)
EOSINOPHIL # BLD AUTO: 0.12 K/UL — SIGNIFICANT CHANGE UP (ref 0–0.7)
EOSINOPHIL NFR BLD AUTO: 0.1 % — SIGNIFICANT CHANGE UP (ref 0–8)
EOSINOPHIL NFR BLD AUTO: 2.1 % — SIGNIFICANT CHANGE UP (ref 0–8)
GLUCOSE BLDC GLUCOMTR-MCNC: 104 MG/DL — HIGH (ref 70–99)
GLUCOSE SERPL-MCNC: 112 MG/DL — HIGH (ref 70–99)
GLUCOSE SERPL-MCNC: 122 MG/DL — HIGH (ref 70–99)
HCT VFR BLD CALC: 30.8 % — LOW (ref 37–47)
HCT VFR BLD CALC: 34.4 % — LOW (ref 37–47)
HGB BLD-MCNC: 10.6 G/DL — LOW (ref 12–16)
HGB BLD-MCNC: 11.6 G/DL — LOW (ref 12–16)
IMM GRANULOCYTES NFR BLD AUTO: 0.1 % — SIGNIFICANT CHANGE UP (ref 0.1–0.3)
IMM GRANULOCYTES NFR BLD AUTO: 0.2 % — SIGNIFICANT CHANGE UP (ref 0.1–0.3)
LYMPHOCYTES # BLD AUTO: 0.9 K/UL — LOW (ref 1.2–3.4)
LYMPHOCYTES # BLD AUTO: 1.32 K/UL — SIGNIFICANT CHANGE UP (ref 1.2–3.4)
LYMPHOCYTES # BLD AUTO: 13.4 % — LOW (ref 20.5–51.1)
LYMPHOCYTES # BLD AUTO: 22.9 % — SIGNIFICANT CHANGE UP (ref 20.5–51.1)
MAGNESIUM SERPL-MCNC: 1.7 MG/DL — LOW (ref 1.8–2.4)
MAGNESIUM SERPL-MCNC: 1.9 MG/DL — SIGNIFICANT CHANGE UP (ref 1.8–2.4)
MCHC RBC-ENTMCNC: 29.6 PG — SIGNIFICANT CHANGE UP (ref 27–31)
MCHC RBC-ENTMCNC: 29.7 PG — SIGNIFICANT CHANGE UP (ref 27–31)
MCHC RBC-ENTMCNC: 33.7 G/DL — SIGNIFICANT CHANGE UP (ref 32–37)
MCHC RBC-ENTMCNC: 34.4 G/DL — SIGNIFICANT CHANGE UP (ref 32–37)
MCV RBC AUTO: 86.3 FL — SIGNIFICANT CHANGE UP (ref 81–99)
MCV RBC AUTO: 87.8 FL — SIGNIFICANT CHANGE UP (ref 81–99)
MONOCYTES # BLD AUTO: 0.3 K/UL — SIGNIFICANT CHANGE UP (ref 0.1–0.6)
MONOCYTES # BLD AUTO: 0.51 K/UL — SIGNIFICANT CHANGE UP (ref 0.1–0.6)
MONOCYTES NFR BLD AUTO: 5.2 % — SIGNIFICANT CHANGE UP (ref 1.7–9.3)
MONOCYTES NFR BLD AUTO: 7.6 % — SIGNIFICANT CHANGE UP (ref 1.7–9.3)
NEUTROPHILS # BLD AUTO: 3.99 K/UL — SIGNIFICANT CHANGE UP (ref 1.4–6.5)
NEUTROPHILS # BLD AUTO: 5.24 K/UL — SIGNIFICANT CHANGE UP (ref 1.4–6.5)
NEUTROPHILS NFR BLD AUTO: 69.3 % — SIGNIFICANT CHANGE UP (ref 42.2–75.2)
NEUTROPHILS NFR BLD AUTO: 78.4 % — HIGH (ref 42.2–75.2)
NRBC # BLD: 0 /100 WBCS — SIGNIFICANT CHANGE UP (ref 0–0)
NRBC # BLD: 0 /100 WBCS — SIGNIFICANT CHANGE UP (ref 0–0)
PHOSPHATE SERPL-MCNC: 2.5 MG/DL — SIGNIFICANT CHANGE UP (ref 2.1–4.9)
PHOSPHATE SERPL-MCNC: 4.3 MG/DL — SIGNIFICANT CHANGE UP (ref 2.1–4.9)
PLATELET # BLD AUTO: 162 K/UL — SIGNIFICANT CHANGE UP (ref 130–400)
PLATELET # BLD AUTO: 169 K/UL — SIGNIFICANT CHANGE UP (ref 130–400)
POTASSIUM SERPL-MCNC: 4.3 MMOL/L — SIGNIFICANT CHANGE UP (ref 3.5–5)
POTASSIUM SERPL-MCNC: 4.3 MMOL/L — SIGNIFICANT CHANGE UP (ref 3.5–5)
POTASSIUM SERPL-SCNC: 4.3 MMOL/L — SIGNIFICANT CHANGE UP (ref 3.5–5)
POTASSIUM SERPL-SCNC: 4.3 MMOL/L — SIGNIFICANT CHANGE UP (ref 3.5–5)
RBC # BLD: 3.57 M/UL — LOW (ref 4.2–5.4)
RBC # BLD: 3.92 M/UL — LOW (ref 4.2–5.4)
RBC # FLD: 13.2 % — SIGNIFICANT CHANGE UP (ref 11.5–14.5)
RBC # FLD: 13.2 % — SIGNIFICANT CHANGE UP (ref 11.5–14.5)
SODIUM SERPL-SCNC: 136 MMOL/L — SIGNIFICANT CHANGE UP (ref 135–146)
SODIUM SERPL-SCNC: 149 MMOL/L — HIGH (ref 135–146)
WBC # BLD: 5.76 K/UL — SIGNIFICANT CHANGE UP (ref 4.8–10.8)
WBC # BLD: 6.7 K/UL — SIGNIFICANT CHANGE UP (ref 4.8–10.8)
WBC # FLD AUTO: 5.76 K/UL — SIGNIFICANT CHANGE UP (ref 4.8–10.8)
WBC # FLD AUTO: 6.7 K/UL — SIGNIFICANT CHANGE UP (ref 4.8–10.8)

## 2022-11-11 RX ORDER — SODIUM CHLORIDE 9 MG/ML
1000 INJECTION, SOLUTION INTRAVENOUS
Refills: 0 | Status: DISCONTINUED | OUTPATIENT
Start: 2022-11-11 | End: 2022-11-11

## 2022-11-11 RX ORDER — ENOXAPARIN SODIUM 100 MG/ML
40 INJECTION SUBCUTANEOUS EVERY 24 HOURS
Refills: 0 | Status: DISCONTINUED | OUTPATIENT
Start: 2022-11-11 | End: 2022-11-12

## 2022-11-11 RX ORDER — MAGNESIUM SULFATE 500 MG/ML
2 VIAL (ML) INJECTION ONCE
Refills: 0 | Status: COMPLETED | OUTPATIENT
Start: 2022-11-11 | End: 2022-11-11

## 2022-11-11 RX ORDER — SODIUM CHLORIDE 9 MG/ML
1000 INJECTION, SOLUTION INTRAVENOUS
Refills: 0 | Status: DISCONTINUED | OUTPATIENT
Start: 2022-11-11 | End: 2022-11-12

## 2022-11-11 RX ADMIN — Medication 650 MILLIGRAM(S): at 11:54

## 2022-11-11 RX ADMIN — ENOXAPARIN SODIUM 40 MILLIGRAM(S): 100 INJECTION SUBCUTANEOUS at 22:00

## 2022-11-11 RX ADMIN — Medication 1 TABLET(S): at 17:29

## 2022-11-11 RX ADMIN — LISINOPRIL 40 MILLIGRAM(S): 2.5 TABLET ORAL at 05:41

## 2022-11-11 RX ADMIN — ESCITALOPRAM OXALATE 20 MILLIGRAM(S): 10 TABLET, FILM COATED ORAL at 11:54

## 2022-11-11 RX ADMIN — Medication 1 TABLET(S): at 11:53

## 2022-11-11 RX ADMIN — Medication 81 MILLIGRAM(S): at 11:53

## 2022-11-11 RX ADMIN — Medication 650 MILLIGRAM(S): at 23:10

## 2022-11-11 RX ADMIN — Medication 650 MILLIGRAM(S): at 17:29

## 2022-11-11 RX ADMIN — Medication 100 GRAM(S): at 05:41

## 2022-11-11 RX ADMIN — Medication 12.5 GRAM(S): at 05:44

## 2022-11-11 RX ADMIN — SODIUM CHLORIDE 50 MILLILITER(S): 9 INJECTION, SOLUTION INTRAVENOUS at 11:30

## 2022-11-11 RX ADMIN — Medication 650 MILLIGRAM(S): at 05:42

## 2022-11-11 RX ADMIN — SODIUM CHLORIDE 40 MILLILITER(S): 9 INJECTION, SOLUTION INTRAVENOUS at 14:26

## 2022-11-11 RX ADMIN — Medication 30 MILLIGRAM(S): at 22:00

## 2022-11-11 RX ADMIN — ESTRADIOL 1 GRAM(S): 4 INSERT VAGINAL at 11:54

## 2022-11-11 RX ADMIN — Medication 1 TABLET(S): at 05:41

## 2022-11-11 NOTE — PROGRESS NOTE ADULT - ATTENDING COMMENTS
71F POD 1 s/p robotic LAR for complicated diverticulitis    Patient seen and examined.  No acute events over night.  Patient tolerating clear liquids with flatus and BM.  Denies nausea vomiting    Abdomen - soft, non distended, appropriately tender. Wounds with dermabond in place.    Vitals and labs reviewed    Plan  - low fiber diet  - mIVF @ 40mL  - DC garcia  - TOV  - 24 hours antibiotics  - PT consult  - GI / DVT PPX  - tylenol oxycodone for pain  - Possible DC home in 24/48 hours

## 2022-11-11 NOTE — PROGRESS NOTE ADULT - ASSESSMENT
A/P:  OSVALDO KOHLI is a 71F HD2/[id number] Robot-assisted sigmoidectomy with colorectal anastomosis      Plan:     - advance to low fiber diet, MIVF @40cc/hr  - encourage ambulation, will consult PT  - monitor bowel function  - likely d/c 11/12    Plan discussed w/ Gopi Gudino team, patient

## 2022-11-11 NOTE — PHYSICAL THERAPY INITIAL EVALUATION ADULT - REHAB POTENTIAL, PT EVAL
Patient is being discharged from the inpatient skilled PT services. Please reconsult if the status changes./none

## 2022-11-11 NOTE — PHYSICAL THERAPY INITIAL EVALUATION ADULT - NSACTIVITYREC_GEN_A_PT
Patient is independent with functional mobility. PT recommends D/C to home with assistance. Refer to evaluation for details.

## 2022-11-12 ENCOUNTER — TRANSCRIPTION ENCOUNTER (OUTPATIENT)
Age: 72
End: 2022-11-12

## 2022-11-12 VITALS
RESPIRATION RATE: 18 BRPM | TEMPERATURE: 98 F | OXYGEN SATURATION: 99 % | SYSTOLIC BLOOD PRESSURE: 151 MMHG | DIASTOLIC BLOOD PRESSURE: 86 MMHG | HEART RATE: 71 BPM

## 2022-11-12 PROCEDURE — 99238 HOSP IP/OBS DSCHRG MGMT 30/<: CPT

## 2022-11-12 RX ADMIN — Medication 650 MILLIGRAM(S): at 06:45

## 2022-11-12 RX ADMIN — LISINOPRIL 40 MILLIGRAM(S): 2.5 TABLET ORAL at 06:46

## 2022-11-12 RX ADMIN — ESCITALOPRAM OXALATE 20 MILLIGRAM(S): 10 TABLET, FILM COATED ORAL at 11:33

## 2022-11-12 RX ADMIN — ESTRADIOL 1 GRAM(S): 4 INSERT VAGINAL at 12:50

## 2022-11-12 RX ADMIN — Medication 1 TABLET(S): at 06:46

## 2022-11-12 RX ADMIN — Medication 1 TABLET(S): at 11:33

## 2022-11-12 RX ADMIN — Medication 650 MILLIGRAM(S): at 11:33

## 2022-11-12 RX ADMIN — Medication 81 MILLIGRAM(S): at 11:33

## 2022-11-12 NOTE — PROGRESS NOTE ADULT - ATTENDING COMMENTS
Patient seen and examined with surgery team on rounds and discussed management plans. Ambulatory + flatus and small BM. Tolerating diet and ambulation . Wounds benign no infection.Dc home today with outpatient follow up.

## 2022-11-12 NOTE — DISCHARGE NOTE PROVIDER - HOSPITAL COURSE
71yF w/ PMH of CAD S/P stent (x1), HTN, depression, diverticulitis, S/P hysterectomy, L hip replacement, ORIF of ankle fracture, and tonsillectomy presented with complicated diverticulitis. Patient is S/P robot-assisted sigmoidectomy with colorectal anastomosis on the 11/10/22. 71yF w/ PMH of CAD S/P stent (x1), HTN, depression, diverticulitis, S/P hysterectomy, L hip replacement, ORIF of ankle fracture, and tonsillectomy presented with complicated diverticulitis. Patient is S/P robot-assisted sigmoidectomy with colorectal anastomosis on the 11/10/22. Patient has progressed well. She is tolerating a diet, has been out of bed, has no complaints, and her abdominal exam is benign. She will be discharged home and will follow up with Dr. Denise in his office.

## 2022-11-12 NOTE — DISCHARGE NOTE PROVIDER - NSDCMRMEDTOKEN_GEN_ALL_CORE_FT
aspirin 81 mg oral tablet, chewable: 1 tab(s) orally once a day  Calcium 600+D oral tablet: 1 tab(s) orally 2 times a day  estradiol 0.1 mg/g vaginal cream: vaginal every other day (at bedtime)  Lexapro 20 mg oral tablet: 1 tab(s) orally once a day (at bedtime)  lisinopril 40 mg oral tablet: 1 tab(s) orally once a day  magnesium glycinate 200 mg oral tablet: 2 tab(s) orally once a day (after a meal)  Multiple Vitamins oral tablet: 1 tab(s) orally once a day

## 2022-11-12 NOTE — DISCHARGE NOTE PROVIDER - CARE PROVIDER_API CALL
John Denise)  Surgery  Colorectal  [address] [facility], [address] [place] [address]  Phone: [phone]  Fax: [phone]  Follow Up Time: 2 weeks

## 2022-11-12 NOTE — DISCHARGE NOTE NURSING/CASE MANAGEMENT/SOCIAL WORK - PATIENT PORTAL LINK FT
You can access the FollowMyHealth Patient Portal offered by [facility] by registering at the following website: [email or web address]. By joining [facility]’s FollowMyHealth portal, you will also be able to view your health information using other applications (apps) compatible with our system.

## 2022-11-12 NOTE — DISCHARGE NOTE PROVIDER - NSDCCPCAREPLAN_GEN_ALL_CORE_FT
Rectal bleeding PRINCIPAL DISCHARGE DIAGNOSIS  Diagnosis: Diverticulitis  Assessment and Plan of Treatment: Follow Up with Dr. Denise in 2 weeks. Please call office for confirmation of your appointment.  Diet: Low residue diet.  Pain: You can take over the counter medications such as Tylenol, and Ibuprofen for pain control. Please adhere to the instructions on the back of the bottle.  If you develop fevers, chills, worsening pain, increased drainage from the wound, foul smelling drainage from the wound, nausea that won't subside, vomiting, or any other symptoms of concern, please call MD for further advice, evaluation, and/or treatment.  Activity: Ambulate and get out of bed as tolerated, and with assistance if feeling weak.

## 2022-11-12 NOTE — DISCHARGE NOTE PROVIDER - NSDCFUSCHEDAPPT_GEN_ALL_CORE_FT
Dominique Murphy  [facility]  GENSURG [address]  Scheduled Appointment: 11/21/2022    John Denise  [facility]  GENR [address]  Scheduled Appointment: 12/07/2022

## 2022-11-12 NOTE — PROGRESS NOTE ADULT - SUBJECTIVE AND OBJECTIVE BOX
GENERAL SURGERY PROGRESS NOTE    Patient: OSVALDO KOHLI , 71y ([birth year])Female   MRN: [id number]  Location: [address]  Visit: 11-10-22 Inpatient  Date: 11-12-22 @ 10:56    Hospital Day #: 3  Post-Op Day #: 2    Procedure/Dx/Injuries: S/P robot assisted sigmoidectomy with colorectal anastomosis     Events of past 24 hours: Patient doing well. No complaints. No acute events overnight.    PAST MEDICAL & SURGICAL HISTORY:  HTN (hypertension)      Depression      Diverticulitis      CAD (coronary artery disease)  CARDIAC STENT X1 2018      Status post hip replacement, left      S/P total hysterectomy      Status post ORIF of fracture of ankle      S/P tonsillectomy      H/O coronary angioplasty  STENT X1 (2018)      H/O right wrist surgery          Vitals:   T(F): 98.6 (11-12-22 @ 08:40), Max: 99.9 (11-11-22 @ 21:11)  HR: 68 (11-12-22 @ 08:40)  BP: 146/67 (11-12-22 @ 08:40)  RR: 18 (11-12-22 @ 08:40)  SpO2: 95% (11-12-22 @ 08:40)      Diet, Low Fiber      Fluids:     I & O's:    Bowel Movement: : [x] YES [] NO  Flatus: : [x] YES [] NO    PHYSICAL EXAM:  General: NAD, AAOx3, calm and cooperative  HEENT: Trachea ML  Cardiac: RRR S1, S2, no Murmurs, rubs or gallops  Respiratory: Normal respiratory effort  Abdomen: Soft, non-distended, non-tender, no rebound, no guarding.   Skin: Warm/dry, normal color, no jaundice  Incision/wound: healing wel    MEDICATIONS  (STANDING):  acetaminophen     Tablet .. 650 milliGRAM(s) Oral every 6 hours  aspirin  chewable 81 milliGRAM(s) Oral daily  calcium carbonate 1250 mG  + Vitamin D (OsCal 500 + D) 1 Tablet(s) Oral every 12 hours  enoxaparin Injectable 40 milliGRAM(s) SubCutaneous every 24 hours  escitalopram 20 milliGRAM(s) Oral daily  estradiol 0.01% Vaginal Cream 1 Gram(s) Vaginal daily  lisinopril 40 milliGRAM(s) Oral daily  multivitamin 1 Tablet(s) Oral daily    MEDICATIONS  (PRN):  HYDROmorphone  Injectable 0.25 milliGRAM(s) IV Push every 6 hours PRN Severe Pain (7 - 10)  ketorolac   Injectable 30 milliGRAM(s) IV Push every 6 hours PRN Moderate Pain (4 - 6)      DVT PROPHYLAXIS: enoxaparin Injectable 40 milliGRAM(s) SubCutaneous every 24 hours    GI PROPHYLAXIS:   ANTICOAGULATION:   ANTIBIOTICS:            LAB/STUDIES:  Labs:  CAPILLARY BLOOD GLUCOSE                              10.6   5.76  )-----------( 162      ( 11 Nov 2022 22:34 )             30.8       Auto Neutrophil %: 69.3 % (11-11-22 @ 22:34)  Auto Immature Granulocyte %: 0.2 % (11-11-22 @ 22:34)    11-11    149<H>  |  113<H>  |  14  ----------------------------<  112<H>  4.3   |  26  |  0.8      Calcium, Total Serum: 8.3 mg/dL (11-11-22 @ 22:34)    IMAGING:  Nothing past 24 hours.    · Assessment	  OSVALDO KOHLI is a 71F HD2/[id number] Robot-assisted sigmoidectomy with colorectal anastomosis      Plan:   - Tolerating diet  - OOB, ambulation  - Continue to monitor bowel function  - Discharge home 11/12  
GENERAL SURGERY PROGRESS NOTE     OSVALDO KOHLI  [address] [place] [address] day :2d  POD: 1  Procedure: Robot-assisted sigmoidectomy with colorectal anastomosis    Surgical Attending: John Denise  Overnight events: No acute events overnight. Patient passing flatus and tolerating clears.    T(F): 98.1 (11-11-22 @ 08:13), Max: 98.9 (11-10-22 @ 17:24)  HR: 66 (11-11-22 @ 08:13) (63 - 81)  BP: 101/55 (11-11-22 @ 08:13) (101/55 - 141/65)  ABP: --  ABP(mean): --  RR: 18 (11-11-22 @ 08:13) (15 - 19)  SpO2: 97% (11-11-22 @ 08:13) (95% - 100%)      11-10-22 @ 07:01  -  11-11-22 @ 07:00  --------------------------------------------------------  IN:  Total IN: 0 mL    OUT:    Indwelling Catheter - Urethral (mL): 950 mL    Voided (mL): 300 mL  Total OUT: 1250 mL    Total NET: -1250 mL        DIET/FLUIDS: calcium carbonate 1250 mG  + Vitamin D (OsCal 500 + D) 1 Tablet(s) Oral every 12 hours  lactated ringers. 1000 milliLiter(s) IV Continuous <Continuous>  multivitamin 1 Tablet(s) Oral daily      URINE:   11-10-22 @ 07:01  -  11-11-22 @ 07:00  --------------------------------------------------------  OUT: 950 mL       GI proph:    AC/ proph: aspirin  chewable 81 milliGRAM(s) Oral daily    ABx:     PHYSICAL EXAM:  GENERAL: NAD, well-appearing  CHEST/LUNG: Clear to auscultation bilaterally  HEART: Regular rate and rhythm  ABDOMEN: Soft, Nontender, Nondistended; incisions c/d/i  EXTREMITIES:  No clubbing, cyanosis, or edema      LABS  Labs:  CAPILLARY BLOOD GLUCOSE      POCT Blood Glucose.: 130 mg/dL (10 Nov 2022 13:28)                          11.6   6.70  )-----------( 169      ( 10 Nov 2022 23:59 )             34.4       Auto Neutrophil %: 78.4 % (11-10-22 @ 23:59)  Auto Immature Granulocyte %: 0.1 % (11-10-22 @ 23:59)  Auto Neutrophil %: 91.5 % (11-10-22 @ 13:54)  Auto Immature Granulocyte %: 0.2 % (11-10-22 @ 13:54)    11-10    136  |  101  |  8<L>  ----------------------------<  122<H>  4.3   |  19  |  0.8      Calcium, Total Serum: 8.4 mg/dL (11-10-22 @ 23:59)

## 2022-11-14 LAB — SURGICAL PATHOLOGY STUDY: SIGNIFICANT CHANGE UP

## 2022-11-17 DIAGNOSIS — Z90.710 ACQUIRED ABSENCE OF BOTH CERVIX AND UTERUS: ICD-10-CM

## 2022-11-17 DIAGNOSIS — Z96.642 PRESENCE OF LEFT ARTIFICIAL HIP JOINT: ICD-10-CM

## 2022-11-17 DIAGNOSIS — K57.32 DIVERTICULITIS OF LARGE INTESTINE WITHOUT PERFORATION OR ABSCESS WITHOUT BLEEDING: ICD-10-CM

## 2022-11-17 DIAGNOSIS — K56.50 INTESTINAL ADHESIONS [BANDS], UNSPECIFIED AS TO PARTIAL VERSUS COMPLETE OBSTRUCTION: ICD-10-CM

## 2022-11-17 DIAGNOSIS — I10 ESSENTIAL (PRIMARY) HYPERTENSION: ICD-10-CM

## 2022-11-17 DIAGNOSIS — Z95.5 PRESENCE OF CORONARY ANGIOPLASTY IMPLANT AND GRAFT: ICD-10-CM

## 2022-11-17 DIAGNOSIS — I25.10 ATHEROSCLEROTIC HEART DISEASE OF NATIVE CORONARY ARTERY WITHOUT ANGINA PECTORIS: ICD-10-CM

## 2022-11-21 ENCOUNTER — APPOINTMENT (OUTPATIENT)
Dept: SURGERY | Facility: CLINIC | Age: 72
End: 2022-11-21

## 2022-11-21 VITALS
BODY MASS INDEX: 23.9 KG/M2 | WEIGHT: 140 LBS | OXYGEN SATURATION: 99 % | SYSTOLIC BLOOD PRESSURE: 156 MMHG | HEIGHT: 64 IN | TEMPERATURE: 97 F | DIASTOLIC BLOOD PRESSURE: 80 MMHG | HEART RATE: 70 BPM

## 2022-11-21 DIAGNOSIS — N80.9 ENDOMETRIOSIS, UNSPECIFIED: ICD-10-CM

## 2022-11-21 PROCEDURE — 99024 POSTOP FOLLOW-UP VISIT: CPT

## 2022-11-21 RX ORDER — METRONIDAZOLE 500 MG/1
500 TABLET ORAL
Qty: 6 | Refills: 0 | Status: DISCONTINUED | COMMUNITY
Start: 2022-11-03 | End: 2022-11-21

## 2022-11-21 RX ORDER — NEOMYCIN SULFATE 500 MG/1
500 TABLET ORAL
Qty: 6 | Refills: 0 | Status: DISCONTINUED | COMMUNITY
Start: 2022-11-03 | End: 2022-11-21

## 2022-11-21 NOTE — ASSESSMENT
[FreeTextEntry1] : Pt is healing well from RSCR on 11/10/22.\par \par Pt to start  HFD and start light activity. \par \par Pt to return to see Dr Denise on 12/7/22

## 2022-11-21 NOTE — PHYSICAL EXAM
[Abdomen Masses] : No abdominal masses [Abdomen Tenderness] : ~T No ~M abdominal tenderness [No HSM] : no hepatosplenomegaly [Respiratory Effort] : normal respiratory effort [Normal Rate and Rhythm] : normal rate and rhythm [No Rash or Lesion] : No rash or lesion [de-identified] : Port and extraction sites with dermabond covering them. No swelling, redness or ,induration noted. No pain noted with palpation. No hernias noted [de-identified] : awake alert
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
## 2022-12-07 ENCOUNTER — NON-APPOINTMENT (OUTPATIENT)
Age: 72
End: 2022-12-07

## 2022-12-12 ENCOUNTER — APPOINTMENT (OUTPATIENT)
Dept: SURGERY | Facility: CLINIC | Age: 72
End: 2022-12-12

## 2022-12-12 VITALS
TEMPERATURE: 95.4 F | DIASTOLIC BLOOD PRESSURE: 70 MMHG | HEART RATE: 86 BPM | WEIGHT: 135.25 LBS | SYSTOLIC BLOOD PRESSURE: 148 MMHG | BODY MASS INDEX: 23.09 KG/M2 | OXYGEN SATURATION: 98 % | HEIGHT: 64 IN

## 2022-12-12 PROCEDURE — 99024 POSTOP FOLLOW-UP VISIT: CPT

## 2022-12-23 ENCOUNTER — APPOINTMENT (OUTPATIENT)
Dept: ORTHOPEDIC SURGERY | Facility: CLINIC | Age: 72
End: 2022-12-23

## 2022-12-23 DIAGNOSIS — S46.212A STRAIN OF MUSCLE, FASCIA AND TENDON OF OTHER PARTS OF BICEPS, LEFT ARM, INITIAL ENCOUNTER: ICD-10-CM

## 2022-12-23 PROCEDURE — 73080 X-RAY EXAM OF ELBOW: CPT | Mod: LT

## 2022-12-23 PROCEDURE — 99204 OFFICE O/P NEW MOD 45 MIN: CPT

## 2022-12-23 RX ORDER — NAPROXEN 500 MG/1
500 TABLET ORAL
Qty: 60 | Refills: 0 | Status: ACTIVE | COMMUNITY
Start: 2022-12-23 | End: 1900-01-01

## 2022-12-23 NOTE — ASSESSMENT
[FreeTextEntry1] : Patient comes in with complaints of pain in the antecubital fossa of the left elbow.  She says that she went to another orthopedist when she lived in [place] who had x-rays done said they were negative in said she should take anti-inflammatories.  She was taking Celebrex which was not helping her.  She says the pain comes and goes.  She does not recall an injury.\par \par   Left elbow:  No gross deformity visualized, full range of motion of elbow, tender to palpation along the insertion of the biceps, negative speed's, full range of motion of the fingers,  neurovascularly intact\par \par  x-rays of the elbow show mild arthritis\par \par  the patient seems have biceps tendinitis to sleep.  It has been going on for 2 years.  I am recommending an MRI.  In addition I would like for her to take anti-inflammatories on a regular basis.  She says the Celebrex and help her but she has taken Aleve before naproxen before and it has.  I will send that to the pharmacy for her.  She will take it on a regular basis for at least 2 weeks.  Once the MRI is complete she will return to see me.

## 2022-12-27 NOTE — PHYSICAL EXAM
[Abdomen Masses] : No abdominal masses [Abdomen Tenderness] : ~T No ~M abdominal tenderness [Respiratory Effort] : normal respiratory effort [Normal Rate and Rhythm] : normal rate and rhythm [de-identified] : soft, non tender, non distended.  Well healed port and extraction sites. [de-identified] : awake, alert and in no acute distress

## 2022-12-27 NOTE — ASSESSMENT
[FreeTextEntry1] : 72F with complicated sigmoid diverticulitis s/p robotic sigmoidectomy\par \par The patient is healing well from surgery.  I recommended that they return to a high fiber diet and light physical activity.  The patient will return in 3 months.\par \par

## 2022-12-27 NOTE — HISTORY OF PRESENT ILLNESS
[FreeTextEntry1] : Patient is a 71F with PMH of HTN, anxiety who presents  post op robotic sigmoidectomy on 11/10/22 for  complicated sigmoid diverticulitis and stricture.  PAthology showed diverticulosis with endometriosis.  She presents today reporting she feels well.   She is tolerating  HFD and having regulars daily BM's. PT denies abdominal pain and reports no  discomfort at incision sites. Patient denies fevers, chills, nausea, vomiting, abdominal pain, constipation, diarrhea, blood in the stool or unexpected weight loss.  Patient denies a family history of colon cancer rectal cancer or inflammatory bowel disease.  \par

## 2022-12-30 ENCOUNTER — APPOINTMENT (OUTPATIENT)
Dept: MRI IMAGING | Facility: CLINIC | Age: 72
End: 2022-12-30
Payer: MEDICARE

## 2022-12-30 PROCEDURE — 73221 MRI JOINT UPR EXTREM W/O DYE: CPT | Mod: LT

## 2023-01-23 ENCOUNTER — OUTPATIENT (OUTPATIENT)
Dept: OUTPATIENT SERVICES | Facility: HOSPITAL | Age: 73
LOS: 1 days | Discharge: HOME | End: 2023-01-23
Payer: MEDICARE

## 2023-01-23 DIAGNOSIS — Z96.642 PRESENCE OF LEFT ARTIFICIAL HIP JOINT: Chronic | ICD-10-CM

## 2023-01-23 DIAGNOSIS — Z98.890 OTHER SPECIFIED POSTPROCEDURAL STATES: Chronic | ICD-10-CM

## 2023-01-23 DIAGNOSIS — Z90.89 ACQUIRED ABSENCE OF OTHER ORGANS: Chronic | ICD-10-CM

## 2023-01-23 DIAGNOSIS — Z90.710 ACQUIRED ABSENCE OF BOTH CERVIX AND UTERUS: Chronic | ICD-10-CM

## 2023-01-23 DIAGNOSIS — Z12.31 ENCOUNTER FOR SCREENING MAMMOGRAM FOR MALIGNANT NEOPLASM OF BREAST: ICD-10-CM

## 2023-01-23 DIAGNOSIS — Z98.61 CORONARY ANGIOPLASTY STATUS: Chronic | ICD-10-CM

## 2023-01-23 PROCEDURE — 77063 BREAST TOMOSYNTHESIS BI: CPT | Mod: 26

## 2023-01-23 PROCEDURE — 77067 SCR MAMMO BI INCL CAD: CPT | Mod: 26

## 2023-03-14 ENCOUNTER — APPOINTMENT (OUTPATIENT)
Dept: SURGERY | Facility: CLINIC | Age: 73
End: 2023-03-14
Payer: MEDICARE

## 2023-03-14 VITALS
BODY MASS INDEX: 24.24 KG/M2 | OXYGEN SATURATION: 96 % | SYSTOLIC BLOOD PRESSURE: 124 MMHG | DIASTOLIC BLOOD PRESSURE: 82 MMHG | TEMPERATURE: 96.8 F | WEIGHT: 142 LBS | HEART RATE: 69 BPM | HEIGHT: 64 IN

## 2023-03-14 DIAGNOSIS — K56.699 OTHER INTESTINAL OBSTRUCTION UNSPECIFIED AS TO PARTIAL VERSUS COMPLETE OBSTRUCTION: ICD-10-CM

## 2023-03-14 DIAGNOSIS — K57.32 DIVERTICULITIS OF LARGE INTESTINE W/OUT PERFORATION OR ABSCESS W/OUT BLEEDING: ICD-10-CM

## 2023-03-14 PROCEDURE — 99213 OFFICE O/P EST LOW 20 MIN: CPT

## 2023-03-16 NOTE — PHYSICAL THERAPY INITIAL EVALUATION ADULT - SOCIAL CONCERNS
Attempted to call patient for outreach on Colorectal Cancer Screening. Patients phone unable to take calls at this time. No message was able to be left.     
None

## 2023-03-27 NOTE — ASSESSMENT
[FreeTextEntry1] : 72-year-old female with Complicated Sigmoid Diverticulitis, S/P Robotic Sigmoidectomy.  \par \par The patient has healed well from surgery. She will continue with the High Fiber diet. She no longer has any restrictions. We will see her back as needed.

## 2023-03-27 NOTE — ADDENDUM
[FreeTextEntry1] : I, Harshad Yun assisted in documentation on 03/14/2023 acting as a scribe for Dr. John Denise.\par 
none

## 2023-03-27 NOTE — HISTORY OF PRESENT ILLNESS
[FreeTextEntry1] : Patient is a 71F with PMH of HTN, anxiety who presents post op robotic sigmoidectomy on 11/10/22 for complicated sigmoid diverticulitis and stricture. PAthology showed diverticulosis with endometriosis. Patient feels well and is tolerating a regular diet. She reports 2 to 3 smaller but soft bowel movements daily. Patient denies fevers, chills, nausea, vomiting, abdominal pain, constipation, diarrhea, blood in the stool or unexpected weight loss. Patient denies a family history of colon cancer rectal cancer or inflammatory bowel disease. \par \par

## 2023-07-17 ENCOUNTER — TELEPHONE ENCOUNTER (OUTPATIENT)
Dept: URBAN - METROPOLITAN AREA CLINIC 7 | Facility: CLINIC | Age: 73
End: 2023-07-17

## 2023-09-15 ENCOUNTER — OUTPATIENT (OUTPATIENT)
Dept: OUTPATIENT SERVICES | Facility: HOSPITAL | Age: 73
LOS: 1 days | End: 2023-09-15
Payer: MEDICARE

## 2023-09-15 DIAGNOSIS — Z98.61 CORONARY ANGIOPLASTY STATUS: Chronic | ICD-10-CM

## 2023-09-15 DIAGNOSIS — Z98.890 OTHER SPECIFIED POSTPROCEDURAL STATES: Chronic | ICD-10-CM

## 2023-09-15 DIAGNOSIS — Z96.642 PRESENCE OF LEFT ARTIFICIAL HIP JOINT: Chronic | ICD-10-CM

## 2023-09-15 DIAGNOSIS — R07.9 CHEST PAIN, UNSPECIFIED: ICD-10-CM

## 2023-09-15 DIAGNOSIS — R06.2 WHEEZING: ICD-10-CM

## 2023-09-15 DIAGNOSIS — Z90.89 ACQUIRED ABSENCE OF OTHER ORGANS: Chronic | ICD-10-CM

## 2023-09-15 DIAGNOSIS — Z90.710 ACQUIRED ABSENCE OF BOTH CERVIX AND UTERUS: Chronic | ICD-10-CM

## 2023-09-15 DIAGNOSIS — Z00.8 ENCOUNTER FOR OTHER GENERAL EXAMINATION: ICD-10-CM

## 2023-09-15 PROCEDURE — 78452 HT MUSCLE IMAGE SPECT MULT: CPT | Mod: 26

## 2023-09-15 PROCEDURE — A9500: CPT

## 2023-09-15 PROCEDURE — 78452 HT MUSCLE IMAGE SPECT MULT: CPT

## 2023-09-16 DIAGNOSIS — R06.2 WHEEZING: ICD-10-CM

## 2023-12-15 ENCOUNTER — OUTPATIENT (OUTPATIENT)
Dept: OUTPATIENT SERVICES | Facility: HOSPITAL | Age: 73
LOS: 1 days | End: 2023-12-15
Payer: MEDICARE

## 2023-12-15 DIAGNOSIS — Z98.61 CORONARY ANGIOPLASTY STATUS: Chronic | ICD-10-CM

## 2023-12-15 DIAGNOSIS — Z90.710 ACQUIRED ABSENCE OF BOTH CERVIX AND UTERUS: Chronic | ICD-10-CM

## 2023-12-15 DIAGNOSIS — Z96.642 PRESENCE OF LEFT ARTIFICIAL HIP JOINT: Chronic | ICD-10-CM

## 2023-12-15 DIAGNOSIS — Z98.890 OTHER SPECIFIED POSTPROCEDURAL STATES: Chronic | ICD-10-CM

## 2023-12-15 DIAGNOSIS — Z90.89 ACQUIRED ABSENCE OF OTHER ORGANS: Chronic | ICD-10-CM

## 2023-12-15 DIAGNOSIS — R07.9 CHEST PAIN, UNSPECIFIED: ICD-10-CM

## 2023-12-15 PROCEDURE — 71046 X-RAY EXAM CHEST 2 VIEWS: CPT

## 2023-12-15 PROCEDURE — 71046 X-RAY EXAM CHEST 2 VIEWS: CPT | Mod: 26

## 2023-12-16 DIAGNOSIS — R07.9 CHEST PAIN, UNSPECIFIED: ICD-10-CM

## 2023-12-23 ENCOUNTER — OUTPATIENT (OUTPATIENT)
Dept: OUTPATIENT SERVICES | Facility: HOSPITAL | Age: 73
LOS: 1 days | End: 2023-12-23
Payer: MEDICARE

## 2023-12-23 DIAGNOSIS — Z98.61 CORONARY ANGIOPLASTY STATUS: Chronic | ICD-10-CM

## 2023-12-23 DIAGNOSIS — Z98.890 OTHER SPECIFIED POSTPROCEDURAL STATES: Chronic | ICD-10-CM

## 2023-12-23 DIAGNOSIS — Z90.710 ACQUIRED ABSENCE OF BOTH CERVIX AND UTERUS: Chronic | ICD-10-CM

## 2023-12-23 DIAGNOSIS — Z00.8 ENCOUNTER FOR OTHER GENERAL EXAMINATION: ICD-10-CM

## 2023-12-23 DIAGNOSIS — I65.22 OCCLUSION AND STENOSIS OF LEFT CAROTID ARTERY: ICD-10-CM

## 2023-12-23 DIAGNOSIS — Z96.642 PRESENCE OF LEFT ARTIFICIAL HIP JOINT: Chronic | ICD-10-CM

## 2023-12-23 DIAGNOSIS — Z90.89 ACQUIRED ABSENCE OF OTHER ORGANS: Chronic | ICD-10-CM

## 2023-12-23 PROCEDURE — 93880 EXTRACRANIAL BILAT STUDY: CPT | Mod: 26

## 2023-12-23 PROCEDURE — 93880 EXTRACRANIAL BILAT STUDY: CPT

## 2023-12-24 DIAGNOSIS — I65.22 OCCLUSION AND STENOSIS OF LEFT CAROTID ARTERY: ICD-10-CM

## 2024-04-30 NOTE — H&P PST ADULT - GASTROINTESTINAL
PAST SURGICAL HISTORY:  H/O left knee surgery post -op DVT    H/O right knee surgery     H/O tubal ligation     History of ankle surgery     History of cholecystectomy     
normal/soft/nontender/nondistended/normal active bowel sounds

## 2024-05-21 ENCOUNTER — NON-APPOINTMENT (OUTPATIENT)
Age: 74
End: 2024-05-21

## 2024-08-27 ENCOUNTER — NEW PATIENT (OUTPATIENT)
Dept: URBAN - METROPOLITAN AREA CLINIC 31 | Facility: CLINIC | Age: 74
End: 2024-08-27

## 2024-08-27 DIAGNOSIS — H35.373: ICD-10-CM

## 2024-08-27 DIAGNOSIS — H52.03: ICD-10-CM

## 2024-08-27 DIAGNOSIS — H04.123: ICD-10-CM

## 2024-08-27 DIAGNOSIS — H25.13: ICD-10-CM

## 2024-08-27 DIAGNOSIS — H40.013: ICD-10-CM

## 2024-08-27 DIAGNOSIS — H43.812: ICD-10-CM

## 2024-08-27 PROCEDURE — 92004 COMPRE OPH EXAM NEW PT 1/>: CPT

## 2024-08-27 PROCEDURE — 92134 CPTRZ OPH DX IMG PST SGM RTA: CPT

## 2024-08-27 PROCEDURE — 92015 DETERMINE REFRACTIVE STATE: CPT

## 2024-08-27 ASSESSMENT — VISUAL ACUITY
OS_PH: 20/30
OS_SC: J10
OU_SC: 20/25
OD_SC: J10
OD_SC: 20/50
OU_SC: J8
OS_SC: 20/40
OD_PH: 20/40

## 2024-08-27 ASSESSMENT — TONOMETRY
OS_IOP_MMHG: 18
OD_IOP_MMHG: 18

## 2024-09-04 ENCOUNTER — CONSULTATION/EVALUATION (OUTPATIENT)
Dept: URBAN - METROPOLITAN AREA CLINIC 29 | Facility: CLINIC | Age: 74
End: 2024-09-04

## 2024-09-04 DIAGNOSIS — H35.373: ICD-10-CM

## 2024-09-04 DIAGNOSIS — H40.013: ICD-10-CM

## 2024-09-04 DIAGNOSIS — H43.812: ICD-10-CM

## 2024-09-04 DIAGNOSIS — H52.03: ICD-10-CM

## 2024-09-04 DIAGNOSIS — H25.13: ICD-10-CM

## 2024-09-04 DIAGNOSIS — H04.123: ICD-10-CM

## 2024-09-04 PROCEDURE — 92136 OPHTHALMIC BIOMETRY: CPT

## 2024-09-04 PROCEDURE — 99214 OFFICE O/P EST MOD 30 MIN: CPT

## 2024-09-16 ENCOUNTER — NON-APPOINTMENT (OUTPATIENT)
Age: 74
End: 2024-09-16

## 2024-09-25 ENCOUNTER — SURGERY/PROCEDURE (OUTPATIENT)
Dept: URBAN - METROPOLITAN AREA SURGERY 17 | Facility: SURGERY | Age: 74
End: 2024-09-25

## 2024-09-25 DIAGNOSIS — H25.11: ICD-10-CM

## 2024-09-25 PROCEDURE — 66984 XCAPSL CTRC RMVL W/O ECP: CPT

## 2024-09-26 ENCOUNTER — POST-OP (OUTPATIENT)
Dept: URBAN - METROPOLITAN AREA CLINIC 31 | Facility: CLINIC | Age: 74
End: 2024-09-26

## 2024-09-26 DIAGNOSIS — Z96.1: ICD-10-CM

## 2024-09-26 PROCEDURE — 99024 POSTOP FOLLOW-UP VISIT: CPT

## 2024-10-02 ENCOUNTER — POST-OP (OUTPATIENT)
Dept: URBAN - METROPOLITAN AREA CLINIC 31 | Facility: CLINIC | Age: 74
End: 2024-10-02

## 2024-10-02 DIAGNOSIS — Z96.1: ICD-10-CM

## 2024-10-02 PROCEDURE — 99024 POSTOP FOLLOW-UP VISIT: CPT

## 2024-10-15 ENCOUNTER — POST OP/EVAL OF SECOND EYE (OUTPATIENT)
Dept: URBAN - METROPOLITAN AREA CLINIC 29 | Facility: CLINIC | Age: 74
End: 2024-10-15

## 2024-10-15 DIAGNOSIS — H25.12: ICD-10-CM

## 2024-10-23 ENCOUNTER — SURGERY/PROCEDURE (OUTPATIENT)
Dept: URBAN - METROPOLITAN AREA SURGERY 17 | Facility: SURGERY | Age: 74
End: 2024-10-23

## 2024-10-23 DIAGNOSIS — H25.12: ICD-10-CM

## 2024-10-23 PROCEDURE — 66984 XCAPSL CTRC RMVL W/O ECP: CPT | Mod: 79,LT

## 2024-10-24 ENCOUNTER — POST-OP (OUTPATIENT)
Dept: URBAN - METROPOLITAN AREA CLINIC 31 | Facility: CLINIC | Age: 74
End: 2024-10-24

## 2024-10-24 DIAGNOSIS — Z96.1: ICD-10-CM

## 2024-10-24 PROCEDURE — 99024 POSTOP FOLLOW-UP VISIT: CPT

## 2024-10-29 ENCOUNTER — POST-OP (OUTPATIENT)
Dept: URBAN - METROPOLITAN AREA CLINIC 29 | Facility: CLINIC | Age: 74
End: 2024-10-29

## 2024-10-29 DIAGNOSIS — Z96.1: ICD-10-CM

## 2025-01-14 ENCOUNTER — FOLLOW UP (OUTPATIENT)
Age: 75
End: 2025-01-14

## 2025-01-14 DIAGNOSIS — H26.493: ICD-10-CM

## 2025-01-14 DIAGNOSIS — H35.373: ICD-10-CM

## 2025-01-14 DIAGNOSIS — H43.812: ICD-10-CM

## 2025-01-14 DIAGNOSIS — H40.013: ICD-10-CM

## 2025-01-14 PROCEDURE — 92250 FUNDUS PHOTOGRAPHY W/I&R: CPT

## 2025-01-14 PROCEDURE — 99214 OFFICE O/P EST MOD 30 MIN: CPT | Mod: 24

## 2025-02-18 ENCOUNTER — FOLLOW UP (OUTPATIENT)
Age: 75
End: 2025-02-18

## 2025-02-18 DIAGNOSIS — H40.013: ICD-10-CM

## 2025-02-18 PROCEDURE — 92133 CPTRZD OPH DX IMG PST SGM ON: CPT

## 2025-02-18 PROCEDURE — 99213 OFFICE O/P EST LOW 20 MIN: CPT

## 2025-02-18 PROCEDURE — 76514 ECHO EXAM OF EYE THICKNESS: CPT

## 2025-02-18 PROCEDURE — 92083 EXTENDED VISUAL FIELD XM: CPT
